# Patient Record
Sex: MALE | Race: WHITE | HISPANIC OR LATINO | Employment: FULL TIME | URBAN - METROPOLITAN AREA
[De-identification: names, ages, dates, MRNs, and addresses within clinical notes are randomized per-mention and may not be internally consistent; named-entity substitution may affect disease eponyms.]

---

## 2022-03-23 ENCOUNTER — APPOINTMENT (EMERGENCY)
Dept: CT IMAGING | Facility: HOSPITAL | Age: 22
End: 2022-03-23

## 2022-03-23 ENCOUNTER — HOSPITAL ENCOUNTER (EMERGENCY)
Facility: HOSPITAL | Age: 22
Discharge: HOME | End: 2022-03-23
Attending: EMERGENCY MEDICINE
Payer: COMMERCIAL

## 2022-03-23 VITALS
TEMPERATURE: 98.8 F | HEART RATE: 73 BPM | WEIGHT: 155 LBS | RESPIRATION RATE: 16 BRPM | SYSTOLIC BLOOD PRESSURE: 127 MMHG | HEIGHT: 71 IN | BODY MASS INDEX: 21.7 KG/M2 | DIASTOLIC BLOOD PRESSURE: 59 MMHG | OXYGEN SATURATION: 99 %

## 2022-03-23 DIAGNOSIS — R10.10 UPPER ABDOMINAL PAIN: Primary | ICD-10-CM

## 2022-03-23 DIAGNOSIS — R11.0 NAUSEA: ICD-10-CM

## 2022-03-23 DIAGNOSIS — Z20.2 EXPOSURE TO CHLAMYDIA: ICD-10-CM

## 2022-03-23 DIAGNOSIS — R20.2 PARESTHESIAS: ICD-10-CM

## 2022-03-23 LAB
ALBUMIN SERPL BCP-MCNC: 4.8 G/DL (ref 3.5–5)
ALP SERPL-CCNC: 56 U/L (ref 46–116)
ALT SERPL W P-5'-P-CCNC: 17 U/L (ref 12–78)
ANION GAP SERPL CALCULATED.3IONS-SCNC: 11 MMOL/L (ref 4–13)
AST SERPL W P-5'-P-CCNC: 10 U/L (ref 5–45)
BASOPHILS # BLD AUTO: 0.03 THOUSANDS/ΜL (ref 0–0.1)
BASOPHILS NFR BLD AUTO: 0 % (ref 0–1)
BILIRUB DIRECT SERPL-MCNC: 0.24 MG/DL (ref 0–0.2)
BILIRUB SERPL-MCNC: 1.37 MG/DL (ref 0.2–1)
BILIRUB UR QL STRIP: NEGATIVE
BUN SERPL-MCNC: 11 MG/DL (ref 5–25)
CALCIUM SERPL-MCNC: 9.2 MG/DL (ref 8.3–10.1)
CHLORIDE SERPL-SCNC: 103 MMOL/L (ref 100–108)
CLARITY UR: CLEAR
CO2 SERPL-SCNC: 25 MMOL/L (ref 21–32)
COLOR UR: YELLOW
CREAT SERPL-MCNC: 1.03 MG/DL (ref 0.6–1.3)
EOSINOPHIL # BLD AUTO: 0.03 THOUSAND/ΜL (ref 0–0.61)
EOSINOPHIL NFR BLD AUTO: 0 % (ref 0–6)
ERYTHROCYTE [DISTWIDTH] IN BLOOD BY AUTOMATED COUNT: 13 % (ref 11.6–15.1)
GFR SERPL CREATININE-BSD FRML MDRD: 104 ML/MIN/1.73SQ M
GLUCOSE SERPL-MCNC: 89 MG/DL (ref 65–140)
GLUCOSE UR STRIP-MCNC: NEGATIVE MG/DL
HCT VFR BLD AUTO: 40.2 % (ref 36.5–49.3)
HGB BLD-MCNC: 13.7 G/DL (ref 12–17)
HGB UR QL STRIP.AUTO: NEGATIVE
IMM GRANULOCYTES # BLD AUTO: 0.02 THOUSAND/UL (ref 0–0.2)
IMM GRANULOCYTES NFR BLD AUTO: 0 % (ref 0–2)
KETONES UR STRIP-MCNC: ABNORMAL MG/DL
LEUKOCYTE ESTERASE UR QL STRIP: NEGATIVE
LIPASE SERPL-CCNC: 58 U/L (ref 73–393)
LYMPHOCYTES # BLD AUTO: 1.28 THOUSANDS/ΜL (ref 0.6–4.47)
LYMPHOCYTES NFR BLD AUTO: 17 % (ref 14–44)
MAGNESIUM SERPL-MCNC: 1.9 MG/DL (ref 1.6–2.6)
MCH RBC QN AUTO: 26 PG (ref 26.8–34.3)
MCHC RBC AUTO-ENTMCNC: 34.1 G/DL (ref 31.4–37.4)
MCV RBC AUTO: 76 FL (ref 82–98)
MONOCYTES # BLD AUTO: 0.88 THOUSAND/ΜL (ref 0.17–1.22)
MONOCYTES NFR BLD AUTO: 12 % (ref 4–12)
NEUTROPHILS # BLD AUTO: 5.28 THOUSANDS/ΜL (ref 1.85–7.62)
NEUTS SEG NFR BLD AUTO: 71 % (ref 43–75)
NITRITE UR QL STRIP: NEGATIVE
NRBC BLD AUTO-RTO: 0 /100 WBCS
PH UR STRIP.AUTO: 8.5 [PH]
PLATELET # BLD AUTO: 249 THOUSANDS/UL (ref 149–390)
PMV BLD AUTO: 9.9 FL (ref 8.9–12.7)
POTASSIUM SERPL-SCNC: 3.4 MMOL/L (ref 3.5–5.3)
PROT SERPL-MCNC: 7.6 G/DL (ref 6.4–8.2)
PROT UR STRIP-MCNC: NEGATIVE MG/DL
RBC # BLD AUTO: 5.26 MILLION/UL (ref 3.88–5.62)
SODIUM SERPL-SCNC: 139 MMOL/L (ref 136–145)
SP GR UR STRIP.AUTO: 1.01 (ref 1–1.03)
UROBILINOGEN UR QL STRIP.AUTO: 0.2 E.U./DL
WBC # BLD AUTO: 7.52 THOUSAND/UL (ref 4.31–10.16)

## 2022-03-23 PROCEDURE — 80048 BASIC METABOLIC PNL TOTAL CA: CPT | Performed by: EMERGENCY MEDICINE

## 2022-03-23 PROCEDURE — 99284 EMERGENCY DEPT VISIT MOD MDM: CPT

## 2022-03-23 PROCEDURE — 85025 COMPLETE CBC W/AUTO DIFF WBC: CPT | Performed by: EMERGENCY MEDICINE

## 2022-03-23 PROCEDURE — 74177 CT ABD & PELVIS W/CONTRAST: CPT

## 2022-03-23 PROCEDURE — 93005 ELECTROCARDIOGRAM TRACING: CPT

## 2022-03-23 PROCEDURE — 87636 SARSCOV2 & INF A&B AMP PRB: CPT | Performed by: EMERGENCY MEDICINE

## 2022-03-23 PROCEDURE — 36415 COLL VENOUS BLD VENIPUNCTURE: CPT | Performed by: EMERGENCY MEDICINE

## 2022-03-23 PROCEDURE — 96365 THER/PROPH/DIAG IV INF INIT: CPT

## 2022-03-23 PROCEDURE — 96366 THER/PROPH/DIAG IV INF ADDON: CPT

## 2022-03-23 PROCEDURE — 80076 HEPATIC FUNCTION PANEL: CPT | Performed by: EMERGENCY MEDICINE

## 2022-03-23 PROCEDURE — 96372 THER/PROPH/DIAG INJ SC/IM: CPT

## 2022-03-23 PROCEDURE — 96375 TX/PRO/DX INJ NEW DRUG ADDON: CPT

## 2022-03-23 PROCEDURE — 87591 N.GONORRHOEAE DNA AMP PROB: CPT | Performed by: EMERGENCY MEDICINE

## 2022-03-23 PROCEDURE — G1004 CDSM NDSC: HCPCS

## 2022-03-23 PROCEDURE — 83735 ASSAY OF MAGNESIUM: CPT | Performed by: EMERGENCY MEDICINE

## 2022-03-23 PROCEDURE — 99285 EMERGENCY DEPT VISIT HI MDM: CPT | Performed by: EMERGENCY MEDICINE

## 2022-03-23 PROCEDURE — 83690 ASSAY OF LIPASE: CPT | Performed by: EMERGENCY MEDICINE

## 2022-03-23 PROCEDURE — 87491 CHLMYD TRACH DNA AMP PROBE: CPT | Performed by: EMERGENCY MEDICINE

## 2022-03-23 PROCEDURE — 81003 URINALYSIS AUTO W/O SCOPE: CPT | Performed by: EMERGENCY MEDICINE

## 2022-03-23 PROCEDURE — C9113 INJ PANTOPRAZOLE SODIUM, VIA: HCPCS | Performed by: EMERGENCY MEDICINE

## 2022-03-23 RX ORDER — FAMOTIDINE 20 MG/1
20 TABLET, FILM COATED ORAL 2 TIMES DAILY
Qty: 60 TABLET | Refills: 0 | Status: SHIPPED | OUTPATIENT
Start: 2022-03-23

## 2022-03-23 RX ORDER — ONDANSETRON 4 MG/1
4 TABLET, ORALLY DISINTEGRATING ORAL EVERY 6 HOURS PRN
Qty: 20 TABLET | Refills: 0 | Status: SHIPPED | OUTPATIENT
Start: 2022-03-23

## 2022-03-23 RX ORDER — DICYCLOMINE HCL 20 MG
20 TABLET ORAL EVERY 6 HOURS PRN
Qty: 50 TABLET | Refills: 0 | Status: SHIPPED | OUTPATIENT
Start: 2022-03-23

## 2022-03-23 RX ORDER — KETOROLAC TROMETHAMINE 30 MG/ML
15 INJECTION, SOLUTION INTRAMUSCULAR; INTRAVENOUS ONCE
Status: COMPLETED | OUTPATIENT
Start: 2022-03-23 | End: 2022-03-23

## 2022-03-23 RX ORDER — PANTOPRAZOLE SODIUM 40 MG/1
40 INJECTION, POWDER, FOR SOLUTION INTRAVENOUS ONCE
Status: COMPLETED | OUTPATIENT
Start: 2022-03-23 | End: 2022-03-23

## 2022-03-23 RX ORDER — FAMOTIDINE 20 MG/1
20 TABLET, FILM COATED ORAL 2 TIMES DAILY
Qty: 60 TABLET | Refills: 0 | Status: SHIPPED | OUTPATIENT
Start: 2022-03-23 | End: 2022-03-23 | Stop reason: SDUPTHER

## 2022-03-23 RX ORDER — ONDANSETRON 2 MG/ML
4 INJECTION INTRAMUSCULAR; INTRAVENOUS ONCE
Status: COMPLETED | OUTPATIENT
Start: 2022-03-23 | End: 2022-03-23

## 2022-03-23 RX ORDER — AZITHROMYCIN 500 MG/1
1000 TABLET, FILM COATED ORAL ONCE
Status: COMPLETED | OUTPATIENT
Start: 2022-03-23 | End: 2022-03-23

## 2022-03-23 RX ADMIN — IOHEXOL 100 ML: 350 INJECTION, SOLUTION INTRAVENOUS at 15:37

## 2022-03-23 RX ADMIN — LIDOCAINE HYDROCHLORIDE 250 MG: 10 INJECTION, SOLUTION EPIDURAL; INFILTRATION; INTRACAUDAL; PERINEURAL at 16:49

## 2022-03-23 RX ADMIN — PANTOPRAZOLE SODIUM 40 MG: 40 INJECTION, POWDER, FOR SOLUTION INTRAVENOUS at 15:04

## 2022-03-23 RX ADMIN — ONDANSETRON 4 MG: 2 INJECTION INTRAMUSCULAR; INTRAVENOUS at 15:04

## 2022-03-23 RX ADMIN — SODIUM CHLORIDE, SODIUM LACTATE, POTASSIUM CHLORIDE, AND CALCIUM CHLORIDE 1000 ML: .6; .31; .03; .02 INJECTION, SOLUTION INTRAVENOUS at 15:05

## 2022-03-23 RX ADMIN — KETOROLAC TROMETHAMINE 15 MG: 30 INJECTION, SOLUTION INTRAMUSCULAR at 15:04

## 2022-03-23 RX ADMIN — AZITHROMYCIN 1000 MG: 500 TABLET, FILM COATED ORAL at 16:49

## 2022-03-23 NOTE — DISCHARGE INSTRUCTIONS
Take the medications as needed, as per the instructions  Take ibuprofen (Motrin, Advil) or acetaminophen (Tylenol) as needed for pain, as per the instructions  Drink plenty of fluids, and eat as you are able to tolerate  Follow-up with your primary care doctor to make sure you are doing better  If you continue to have pain, follow-up with GI for further evaluation  If your chlamydia test does come back positive, you will need to have it repeated in several weeks to ensure it has cleared completely  Return if you have severe pain that does not improve with medications, persistent vomiting despite the nausea medication, or for any other concerns

## 2022-03-24 LAB
ATRIAL RATE: 77 BPM
ATRIAL RATE: 80 BPM
C TRACH DNA SPEC QL NAA+PROBE: POSITIVE
FLUAV RNA RESP QL NAA+PROBE: NEGATIVE
FLUBV RNA RESP QL NAA+PROBE: NEGATIVE
N GONORRHOEA DNA SPEC QL NAA+PROBE: NEGATIVE
P AXIS: 64 DEGREES
P AXIS: 72 DEGREES
PR INTERVAL: 186 MS
PR INTERVAL: 194 MS
QRS AXIS: 90 DEGREES
QRS AXIS: 92 DEGREES
QRSD INTERVAL: 80 MS
QRSD INTERVAL: 86 MS
QT INTERVAL: 382 MS
QT INTERVAL: 390 MS
QTC INTERVAL: 432 MS
QTC INTERVAL: 449 MS
SARS-COV-2 RNA RESP QL NAA+PROBE: NEGATIVE
T WAVE AXIS: 75 DEGREES
T WAVE AXIS: 81 DEGREES
VENTRICULAR RATE: 77 BPM
VENTRICULAR RATE: 80 BPM

## 2022-03-24 PROCEDURE — 93010 ELECTROCARDIOGRAM REPORT: CPT | Performed by: INTERNAL MEDICINE

## 2022-03-24 NOTE — RESULT ENCOUNTER NOTE
1st attempt  Covid and flu negative attempted to contact patient regarding results  No answer, unable to leave message as no voicemail box set up

## 2024-03-08 NOTE — ED PROVIDER NOTES
History  Chief Complaint   Patient presents with    Abdominal Pain     rec'd covid vaccine yesterday, today started with abd pain, heavy fast breathing and facial numbess      HPI    None       No past medical history on file  No past surgical history on file  No family history on file  I have reviewed and agree with the history as documented  No existing history information found  No existing history information found  Social History     Tobacco Use    Smoking status: Not on file    Smokeless tobacco: Not on file   Substance Use Topics    Alcohol use: Not on file    Drug use: Not on file       Review of Systems    Physical Exam  Physical Exam  Vitals and nursing note reviewed  Constitutional:       General: He is not in acute distress  Appearance: He is well-developed  Comments: Hypertensive in triage, improves on repeat   HENT:      Head: Normocephalic and atraumatic  Eyes:      Conjunctiva/sclera: Conjunctivae normal       Pupils: Pupils are equal, round, and reactive to light  Neck:      Trachea: No tracheal deviation  Cardiovascular:      Rate and Rhythm: Normal rate and regular rhythm  Heart sounds: Normal heart sounds  Pulmonary:      Effort: Pulmonary effort is normal  No respiratory distress  Breath sounds: Normal breath sounds  Abdominal:      General: Bowel sounds are normal  There is no distension  Palpations: Abdomen is soft  Tenderness: There is abdominal tenderness (mild) in the right lower quadrant and epigastric area  There is no right CVA tenderness, left CVA tenderness, guarding or rebound  Negative signs include Mina's sign  Musculoskeletal:      Cervical back: Normal range of motion  Skin:     General: Skin is warm and dry  Neurological:      Mental Status: He is alert and oriented to person, place, and time  GCS: GCS eye subscore is 4  GCS verbal subscore is 5  GCS motor subscore is 6     Psychiatric:         Mood and Affect: Mood is anxious (when talking about exposure to Chlamydia)  Behavior: Behavior normal          Vital Signs  ED Triage Vitals [03/23/22 1238]   Temperature Pulse Respirations Blood Pressure SpO2   98 1 °F (36 7 °C) 98 22 (!) 152/106 100 %      Temp Source Heart Rate Source Patient Position - Orthostatic VS BP Location FiO2 (%)   Oral Monitor Other (Comment) Left arm --      Pain Score       --           Vitals:    03/23/22 1238 03/23/22 1530 03/23/22 1600   BP: (!) 152/106 167/90 134/64   Pulse: 98 84 87   Patient Position - Orthostatic VS: Other (Comment)           Visual Acuity      ED Medications  Medications   ondansetron (ZOFRAN) injection 4 mg (4 mg Intravenous Given 3/23/22 1504)   ketorolac (TORADOL) injection 15 mg (15 mg Intravenous Given 3/23/22 1504)   pantoprazole (PROTONIX) injection 40 mg (40 mg Intravenous Given 3/23/22 1504)   lactated ringers bolus 1,000 mL (1,000 mL Intravenous New Bag 3/23/22 1505)   iohexol (OMNIPAQUE) 350 MG/ML injection (SINGLE-DOSE) 100 mL (100 mL Intravenous Given 3/23/22 1537)       Diagnostic Studies  Results Reviewed     Procedure Component Value Units Date/Time    UA w Reflex to Microscopic w Reflex to Culture [[id number]]  (Abnormal) Collected: 03/23/22 1556    Lab Status: Final result Specimen: Urine, Clean Catch Updated: 03/23/22 1612     Color, UA Yellow     Clarity, UA Clear     Specific Gravity, UA 1 015     pH, UA 8 5     Leukocytes, UA Negative     Nitrite, UA Negative     Protein, UA Negative mg/dl      Glucose, UA Negative mg/dl      Ketones, UA 40 (2+) mg/dl      Urobilinogen, UA 0 2 E U /dl      Bilirubin, UA Negative     Blood, UA Negative    Chlamydia/GC amplified DNA by PCR [[id number]] Collected: 03/23/22 1557    Lab Status:  In process Specimen: Urine, Other Updated: 03/23/22 3335    Basic metabolic panel [[id number]]  (Abnormal) Collected: 03/23/22 1504    Lab Status: Final result Specimen: Blood from Arm, Right Updated: 03/23/22 1527     Sodium 139 mmol/L      Potassium 3 4 mmol/L      Chloride 103 mmol/L      CO2 25 mmol/L      ANION GAP 11 mmol/L      BUN 11 mg/dL      Creatinine 1 03 mg/dL      Glucose 89 mg/dL      Calcium 9 2 mg/dL      eGFR 104 ml/min/1 73sq m     Narrative:      National Kid[employer] Disease Foundation guidelines for Chronic Kidney Disease (CKD):     Stage 1 with normal or high GFR (GFR > 90 mL/min/1 73 square meters)    Stage 2 Mild CKD (GFR = 60-89 mL/min/1 73 square meters)    Stage 3A Moderate CKD (GFR = 45-59 mL/min/1 73 square meters)    Stage 3B Moderate CKD (GFR = 30-44 mL/min/1 73 square meters)    Stage 4 Severe CKD (GFR = 15-29 mL/min/1 73 square meters)    Stage 5 End Stage CKD (GFR <15 mL/min/1 73 square meters)  Note: GFR calculation is accurate only with a steady state creatinine    Hepatic function panel [[id number]]  (Abnormal) Collected: 03/23/22 1504    Lab Status: Final result Specimen: Blood from Arm, Right Updated: 03/23/22 1527     Total Bilirubin 1 37 mg/dL      Bilirubin, Direct 0 24 mg/dL      Alkaline Phosphatase 56 U/L      AST 10 U/L      ALT 17 U/L      Total Protein 7 6 g/dL      Albumin 4 8 g/dL     Lipase [[id number]]  (Abnormal) Collected: 03/23/22 1504    Lab Status: Final result Specimen: Blood from Arm, Right Updated: 03/23/22 1527     Lipase 58 u/L     Magnesium [[id number]]  (Normal) Collected: 03/23/22 1504    Lab Status: Final result Specimen: Blood from Arm, Right Updated: 03/23/22 1527     Magnesium 1 9 mg/dL     CBC and differential [[id number]]  (Abnormal) Collected: 03/23/22 1504    Lab Status: Final result Specimen: Blood from Arm, Right Updated: 03/23/22 1512     WBC 7 52 Thousand/uL      RBC 5 26 Million/uL      Hemoglobin 13 7 g/dL      Hematocrit 40 2 %      MCV 76 fL      MCH 26 0 pg      MCHC 34 1 g/dL      RDW 13 0 %      MPV 9 9 fL      Platelets 527 Thousands/uL      nRBC 0 /100 WBCs      Neutrophils Relative 71 %      Immat GRANS % 0 %      Lymphocytes Relative 17 %      Monocytes Relative 12 %      Eosinophils Relative 0 %      Basophils Relative 0 %      Neutrophils Absolute 5 28 Thousands/µL      Immature Grans Absolute 0 02 Thousand/uL      Lymphocytes Absolute 1 28 Thousands/µL      Monocytes Absolute 0 88 Thousand/µL      Eosinophils Absolute 0 03 Thousand/µL      Basophils Absolute 0 03 Thousands/µL                  CT abdomen pelvis with contrast   Final Result by Jaime Vasquez MD (03/23 0806)      No acute CT findings in the abdomen or pelvis  Workstation performed: [id number]                    Procedures  ECG 12 Lead Documentation Only    Date/Time: 3/23/2022 4:21 PM  Performed by: Keiry Vidal MD  Authorized by: Keiry Vidal MD     Indications / Diagnosis:  Numbness  ECG reviewed by me, the ED Provider: yes    Patient location:  ED  Previous ECG:     Previous ECG:  Unavailable  Interpretation:     Interpretation: non-specific    Rate:     ECG rate:  77    ECG rate assessment: normal    Rhythm:     Rhythm: sinus rhythm      Rhythm comment:  Sinus arrhythmia  Ectopy:     Ectopy: none    QRS:     QRS axis:  Right    QRS intervals:  Normal  Conduction:     Conduction: normal    ST segments:     ST segments:  Normal  T waves:     T waves: normal    Comments:      Large QRS complexes in precordial leads, likely due to body habitus             ED Course                                             MDM  Number of Diagnoses or Management Options  Exposure to chlamydia: new and requires workup  Nausea: new and requires workup  Paresthesias: new and requires workup  Upper abdominal pain: new and requires workup  Diagnosis management comments: This is a 20-year-old male who presents here today for evaluation of abdominal pain  He says he was with his girlfriend, who is a patient here, when he suddenly felt like he was "breathing heavy," got nauseous and had upper abdominal pain    He says he does not feel short of breath but feels like he is still struggling to brief because of pain in his upper abdomen with trying to do so  He has not actually vomited  He says he felt like his whole body "went numb" but he fell asleep while in the waiting room and says that had all resolved by the time he woke up  He denies weakness with this, headache, URI symptoms or fevers  He says she mentioned that she had chlamydia, which did occur shortly prior to symptom onset  He does endorse recent "burning" with urination but denies any dysuria, frequency, hematuria, discharge  He denies known prior history of STDs  He says prior to his current girlfriend, he has always used condoms with intercourse  He denies underlying medical problems, prior abdominal surgeries, issues with chronic or recurrent abdominal pain  He has not taken anything for his symptoms  He does state he got his first dose of the [address] vaccine yesterday  Review of systems:  Otherwise negative unless stated as above    He is well-appearing, in no acute distress  He was hypertensive initially, but this improved on repeat  He does have tenderness to the epigastrium and right lower quadrant  He has no peritoneal sign or Mina sign  He does become anxious when talking about his girlfriend's chlamydia diagnosis  Exam is otherwise unremarkable  Given onset of symptoms in relation to hearing about her having chlamydia, his trouble breathing and diffuse paresthesias are likely related to anxiety and hyperventilation  This may or may not be contributing to his abdominal pain, or this may be unrelated  His recent urinary burning may be chlamydia, given exposure to this  Or pain may be due to help off the side effect is of Moderna vaccine, gastritis, enteritis, colitis, appendicitis  We will check lab work and CT scan to evaluate  We will treat his symptoms  He has had improvement of symptoms  Lab work is unremarkable  CT scan shows no acute abnormalities    We will treat him prophylactically for chlamydia pending results  I discussed with him symptomatic treatment at home, follow-up with PCP, indications for return, and he expresses understanding with this plan  Amount and/or Complexity of Data Reviewed  Clinical lab tests: ordered and reviewed  Tests in the radiology section of CPT®: ordered and reviewed  Independent visualization of images, tracings, or specimens: yes        Disposition  Final diagnoses:   None     ED Disposition     ED Disposition Condition Date/Time Comment    Discharge Good Wed Mar 23, 2022  4:14 PM Jeanna Bravo discharge to home/self care  Follow-up Information    None         Patient's Medications    No medications on file       No discharge procedures on file      PDMP Review     None          ED Provider  Electronically Signed by           Haydee Del Valle MD  03/23/22 4292 [address] 226: Preventive Care And Screening: Tobacco Use: Screening And Cessation Intervention: Patient screened for tobacco use and is an ex/non-smoker

## 2024-12-18 ENCOUNTER — APPOINTMENT (EMERGENCY)
Dept: RADIOLOGY | Facility: HOSPITAL | Age: 24
End: 2024-12-18

## 2024-12-18 ENCOUNTER — HOSPITAL ENCOUNTER (EMERGENCY)
Facility: HOSPITAL | Age: 24
Discharge: HOME | End: 2024-12-18
Attending: EMERGENCY MEDICINE

## 2024-12-18 VITALS
OXYGEN SATURATION: 99 % | WEIGHT: 150 LBS | HEART RATE: 76 BPM | RESPIRATION RATE: 22 BRPM | SYSTOLIC BLOOD PRESSURE: 105 MMHG | DIASTOLIC BLOOD PRESSURE: 58 MMHG | HEIGHT: 71 IN | TEMPERATURE: 98.7 F | BODY MASS INDEX: 21 KG/M2

## 2024-12-18 DIAGNOSIS — S62.336A: Primary | ICD-10-CM

## 2024-12-18 PROCEDURE — 73130 X-RAY EXAM OF HAND: CPT

## 2024-12-18 PROCEDURE — 29125 APPL SHORT ARM SPLINT STATIC: CPT | Performed by: EMERGENCY MEDICINE

## 2024-12-18 PROCEDURE — 99284 EMERGENCY DEPT VISIT MOD MDM: CPT | Performed by: EMERGENCY MEDICINE

## 2024-12-18 PROCEDURE — 99283 EMERGENCY DEPT VISIT LOW MDM: CPT

## 2024-12-18 RX ORDER — IBUPROFEN 600 MG/1
600 TABLET, FILM COATED ORAL ONCE
Status: COMPLETED | OUTPATIENT
Start: 2024-12-18 | End: 2024-12-18

## 2024-12-18 RX ADMIN — IBUPROFEN 600 MG: 600 TABLET, FILM COATED ORAL at 17:40

## 2024-12-18 NOTE — Clinical Note
Brent Blackwell was seen and treated in our emergency department on 12/18/2024.            No use of right hand until cleared by orthopedic/hand specialist    Diagnosis: Right hand fracture    Brent  may return to work on return date.    He may return on this date: 12/19/2024         If you have any questions or concerns, please don't hesitate to call.      Marilyn Williamson, DO    ______________________________           _______________          _______________  Hospital Representative                              Date                                Time

## 2024-12-18 NOTE — ED PROVIDER NOTES
Time reflects when diagnosis was documented in both MDM as applicable and the Disposition within this note       Time User Action Codes Description Comment    12/18/2024  6:11 PM Marilyn Williamson Add [S62.336A] Closed fracture of neck of fifth metacarpal bone of right hand           ED Disposition       ED Disposition   Discharge    Condition   Stable    Date/Time   Wed Dec 18, 2024  6:13 PM    Comment   Brent [place]helene Blackwell discharge to home/self care.                   Assessment & Plan       Medical Decision Making  24-year-old male presents to the ED for right hand pain and swelling after punching a wall yesterday.  Differential includes hand contusion, sprain versus fracture.  Will obtain x-ray of the right hand, provide ibuprofen and ice for pain relief.    Amount and/or Complexity of Data Reviewed  Radiology: ordered and independent interpretation performed. Decision-making details documented in ED Course.    Risk  Prescription drug management.        ED Course as of 12/27/24 1911   Wed Dec 18, 2024   1810 Updated patient about x-ray findings of acute fracture of the distal end of the fifth metacarpal bone.  Plan is to place in an ulnar gutter splint and refer to hand/orthopedic surgery.  Discussed how to take care of the splint at home, to keep it on and not to get it wet until seen by orthopedic surgery.  Discussed symptomatic management including continued icing over the splint on and off, ibuprofen and Tylenol as needed.  Discussed ED return parameters.       Medications   ibuprofen (MOTRIN) tablet 600 mg (600 mg Oral Given 12/18/24 1740)       ED Risk Strat Scores                          SBIRT 22yo+      Flowsheet Row Most Recent Value   Initial Alcohol Screen: US AUDIT-C     1. How often do you have a drink containing alcohol? 0 Filed at: 12/18/2024 1713   2. How many drinks containing alcohol do you have on a typical day you are drinking?  0 Filed at: 12/18/2024 1713   3a. Male UNDER 65: How often  do you have five or more drinks on one occasion? 0 Filed at: 12/18/2024 1713   Audit-C Score 0 Filed at: 12/18/2024 1713   LUH: How many times in the past year have you...    Used an illegal drug or used a prescription medication for non-medical reasons? Never Filed at: 12/18/2024 1713                            History of Present Illness       Chief Complaint   Patient presents with    Hand Injury     C/o R hand pain after punching a wall last night.        History reviewed. No pertinent past medical history.   History reviewed. No pertinent surgical history.   History reviewed. No pertinent family history.   Social History     Tobacco Use    Smoking status: Never    Smokeless tobacco: Never   Vaping Use    Vaping status: Every Day   Substance Use Topics    Alcohol use: Never    Drug use: Never      E-Cigarette/Vaping    E-Cigarette Use Current Every Day User       E-Cigarette/Vaping Substances      I have reviewed and agree with the history as documented.     Patient is a 24-year-old male with no significant past medical history, presents to the ED for right hand pain and swelling after he punched a wall last night out of frustration.  Patient states he was upset, punched a wall and immediately felt pain in the right hand, mostly ulnar aspect.  He states it was more swollen last night and earlier today and the swelling has come down but it is still painful.  He took Tylenol several hours ago.  He denies any paresthesia or weakness in the right upper extremity.  Denies any pain at the wrist or proximal right upper extremity and only reports pain to the hand and digits.  Patient is right-hand dominant.  He denies any other injuries or complaints at this time and rest of review of systems is negative.  Denies blood thinner use.      History provided by:  Patient   used: No    Hand Injury  Associated symptoms: no back pain, no fever and no neck pain        Review of Systems   Constitutional:   Negative for chills and fever.   HENT:  Negative for congestion, ear pain, rhinorrhea and sore throat.    Respiratory:  Negative for cough and shortness of breath.    Cardiovascular:  Negative for chest pain and palpitations.   Gastrointestinal:  Negative for abdominal pain, nausea and vomiting.   Musculoskeletal:  Positive for arthralgias and joint swelling. Negative for back pain and neck pain.        +Right hand pain and swelling s/p injury.    Skin:  Negative for color change, pallor, rash and wound.   Allergic/Immunologic: Negative for immunocompromised state.   Neurological:  Negative for dizziness, weakness, light-headedness, numbness and headaches.   Hematological:  Negative for adenopathy. Does not bruise/bleed easily.   Psychiatric/Behavioral:  Negative for confusion and decreased concentration.    All other systems reviewed and are negative.          Objective       ED Triage Vitals   Temperature Pulse Blood Pressure Respirations SpO2 Patient Position - Orthostatic VS   12/18/24 1712 12/18/24 1712 12/18/24 1712 12/18/24 1712 12/18/24 1712 12/18/24 1712   98.7 °F (37.1 °C) 76 105/58 22 99 % Sitting      Temp Source Heart Rate Source BP Location FiO2 (%) Pain Score    12/18/24 1712 12/18/24 1712 12/18/24 1712 -- 12/18/24 1740    Oral Monitor Left arm  4      Vitals      Date and Time Temp Pulse SpO2 Resp BP Pain Score FACES Pain Rating User   12/18/24 1740 -- -- -- -- -- 4 -- AM   12/18/24 1712 98.7 °F (37.1 °C) 76 99 % 22 105/58 -- -- AS            Physical Exam  Vitals and nursing note reviewed.   Constitutional:       General: He is not in acute distress.     Appearance: Normal appearance. He is well-developed. He is not ill-appearing, toxic-appearing or diaphoretic.   HENT:      Head: Normocephalic and atraumatic.      Right Ear: External ear normal.      Left Ear: External ear normal.      Nose: Nose normal.      Mouth/Throat:      Mouth: Mucous membranes are moist.      Pharynx: Oropharynx is clear.    Eyes:      Extraocular Movements: Extraocular movements intact.      Conjunctiva/sclera: Conjunctivae normal.   Neck:      Vascular: No JVD.   Cardiovascular:      Rate and Rhythm: Normal rate and regular rhythm.      Pulses: Normal pulses.      Heart sounds: Normal heart sounds. No murmur heard.     No friction rub. No gallop.   Pulmonary:      Effort: Pulmonary effort is normal. No respiratory distress.      Breath sounds: Normal breath sounds. No wheezing, rhonchi or rales.   Abdominal:      General: There is no distension.      Palpations: Abdomen is soft.      Tenderness: There is no abdominal tenderness. There is no guarding or rebound.   Musculoskeletal:         General: Swelling, tenderness and signs of injury present. No deformity.      Cervical back: Normal range of motion and neck supple. No rigidity.      Comments: RIGHT UPPER EXTREMITY: Right hand is visibly edematous, mildly ecchymotic and tender over the dorsal aspect along the third, fourth and fifth metacarpal bones.  There is tenderness to the proximal fifth phalanx as well as the fourth and fifth MCP joints.  No tenderness at the wrist joint, forearm, elbow or proximal arm.  Full range of motion of right elbow and wrist joints.  Patient able to extend all digits and flex all digits without difficulty.  Intact hand  strength.  No gross motor or sensory deficits in the median, ulnar or radial nerve distribution.  2+ radial pulse.  Normal capillary refill.   Skin:     General: Skin is warm and dry.      Coloration: Skin is not pale.      Findings: No erythema or rash.   Neurological:      General: No focal deficit present.      Mental Status: He is alert and oriented to person, place, and time.      Sensory: No sensory deficit.      Motor: No weakness.   Psychiatric:         Mood and Affect: Mood normal.         Behavior: Behavior normal.         Results Reviewed       None            XR hand 3+ views RIGHT   ED Interpretation by Marilyn  Marialuisa Williamson DO (12/18 9576)   Acute fracture of the neck and head of the fifth metacarpal bone of the right hand consistent with boxer's fracture.      Final Interpretation by Kushal Cabezas MD (12/19 2332)      Acute slightly angulated fracture in the neck of the fifth metacarpal.         Computerized Assisted Algorithm (CAA) may have been used to analyze all applicable images.         Workstation performed: [id number]             Splint application    Date/Time: 12/18/2024 6:15 PM    Performed by: Marilyn Williamson DO  Authorized by: Marilyn Williamson DO  Universal Protocol:  Procedure performed by: (ED tech performed splint application under ED physician supervision)  Consent: Verbal consent obtained.  Risks and benefits: risks, benefits and alternatives were discussed  Consent given by: patient  Patient understanding: patient states understanding of the procedure being performed  Radiology Images displayed and confirmed. If images not available, report reviewed: imaging studies available  Patient identity confirmed: verbally with patient    Pre-procedure details:     Sensation:  Normal    Skin color:  Normal  Procedure details:     Laterality:  Right    Location:  Hand    Hand:  R hand    Strapping: no      Splint type:  Ulnar gutter (STATIC ulnar gutter splint)    Supplies:  Cotton padding and Ortho-Glass  Post-procedure details:     Pain:  Improved    Sensation:  Normal    Skin color:  Normal    Patient tolerance of procedure:  Tolerated well, no immediate complications      ED Medication and Procedure Management   Prior to Admission Medications   Prescriptions Last Dose Informant Patient Reported? Taking?   dicyclomine (BENTYL) 20 mg tablet   No No   Sig: Take 1 tablet (20 mg total) by mouth every 6 (six) hours as needed (abdominal pain)   famotidine (PEPCID) 20 mg tablet   No No   Sig: Take 1 tablet (20 mg total) by mouth 2 (two) times a day For 1 week, then as needed thereafter    ondansetron (ZOFRAN-ODT) 4 mg disintegrating tablet   No No   Sig: Take 1 tablet (4 mg total) by mouth every 6 (six) hours as needed for nausea or vomiting      Facility-Administered Medications: None     Discharge Medication List as of 12/18/2024  6:14 PM        CONTINUE these medications which have NOT CHANGED    Details   dicyclomine (BENTYL) 20 mg tablet Take 1 tablet (20 mg total) by mouth every 6 (six) hours as needed (abdominal pain), Starting Wed 3/23/2022, Print      famotidine (PEPCID) 20 mg tablet Take 1 tablet (20 mg total) by mouth 2 (two) times a day For 1 week, then as needed thereafter, Starting Wed 3/23/2022, Print      ondansetron (ZOFRAN-ODT) 4 mg disintegrating tablet Take 1 tablet (4 mg total) by mouth every 6 (six) hours as needed for nausea or vomiting, Starting Wed 3/23/2022, Print             ED SEPSIS DOCUMENTATION   Time reflects when diagnosis was documented in both MDM as applicable and the Disposition within this note       Time User Action Codes Description Comment    12/18/2024  6:11 PM Marilyn Williamson [S62.336A] Closed fracture of neck of fifth metacarpal bone of right hand                  Marilyn Williamson, DO  12/18/24 1829       Marilyn Williamson, DO  12/27/24 1912